# Patient Record
Sex: FEMALE | Race: BLACK OR AFRICAN AMERICAN | NOT HISPANIC OR LATINO | Employment: FULL TIME | URBAN - METROPOLITAN AREA
[De-identification: names, ages, dates, MRNs, and addresses within clinical notes are randomized per-mention and may not be internally consistent; named-entity substitution may affect disease eponyms.]

---

## 2020-03-24 ENCOUNTER — OFFICE VISIT (OUTPATIENT)
Dept: URGENT CARE | Facility: URGENT CARE | Age: 25
End: 2020-03-24

## 2020-03-24 VITALS
SYSTOLIC BLOOD PRESSURE: 102 MMHG | HEART RATE: 73 BPM | TEMPERATURE: 97 F | OXYGEN SATURATION: 98 % | WEIGHT: 151.4 LBS | RESPIRATION RATE: 20 BRPM | DIASTOLIC BLOOD PRESSURE: 70 MMHG

## 2020-03-24 DIAGNOSIS — R11.0 NAUSEA: ICD-10-CM

## 2020-03-24 DIAGNOSIS — R53.1 WEAKNESS: Primary | ICD-10-CM

## 2020-03-24 DIAGNOSIS — Z86.2 HISTORY OF ANEMIA: ICD-10-CM

## 2020-03-24 DIAGNOSIS — R53.83 OTHER FATIGUE: ICD-10-CM

## 2020-03-24 LAB
ALBUMIN SERPL-MCNC: 3.8 G/DL (ref 3.4–5)
ALBUMIN UR-MCNC: NEGATIVE MG/DL
ALP SERPL-CCNC: 102 U/L (ref 40–150)
ALT SERPL W P-5'-P-CCNC: 18 U/L (ref 0–50)
ANION GAP SERPL CALCULATED.3IONS-SCNC: 5 MMOL/L (ref 3–14)
APPEARANCE UR: CLEAR
AST SERPL W P-5'-P-CCNC: 7 U/L (ref 0–45)
BACTERIA #/AREA URNS HPF: ABNORMAL /HPF
BASOPHILS # BLD AUTO: 0 10E9/L (ref 0–0.2)
BASOPHILS NFR BLD AUTO: 0.3 %
BILIRUB SERPL-MCNC: 0.4 MG/DL (ref 0.2–1.3)
BILIRUB UR QL STRIP: NEGATIVE
BUN SERPL-MCNC: 8 MG/DL (ref 7–30)
CALCIUM SERPL-MCNC: 9.2 MG/DL (ref 8.5–10.1)
CHLORIDE SERPL-SCNC: 104 MMOL/L (ref 94–109)
CO2 SERPL-SCNC: 27 MMOL/L (ref 20–32)
COLOR UR AUTO: YELLOW
CREAT SERPL-MCNC: 0.72 MG/DL (ref 0.52–1.04)
DIFFERENTIAL METHOD BLD: ABNORMAL
EOSINOPHIL # BLD AUTO: 0.1 10E9/L (ref 0–0.7)
EOSINOPHIL NFR BLD AUTO: 0.6 %
ERYTHROCYTE [DISTWIDTH] IN BLOOD BY AUTOMATED COUNT: 19.1 % (ref 10–15)
GFR SERPL CREATININE-BSD FRML MDRD: >90 ML/MIN/{1.73_M2}
GLUCOSE SERPL-MCNC: 100 MG/DL (ref 70–99)
GLUCOSE UR STRIP-MCNC: NEGATIVE MG/DL
HCT VFR BLD AUTO: 33.1 % (ref 35–47)
HETEROPH AB SER QL: NEGATIVE
HGB BLD-MCNC: 9.8 G/DL (ref 11.7–15.7)
HGB UR QL STRIP: NEGATIVE
KETONES UR STRIP-MCNC: NEGATIVE MG/DL
LEUKOCYTE ESTERASE UR QL STRIP: NEGATIVE
LYMPHOCYTES # BLD AUTO: 2.1 10E9/L (ref 0.8–5.3)
LYMPHOCYTES NFR BLD AUTO: 19.7 %
MCH RBC QN AUTO: 20.2 PG (ref 26.5–33)
MCHC RBC AUTO-ENTMCNC: 29.6 G/DL (ref 31.5–36.5)
MCV RBC AUTO: 68 FL (ref 78–100)
MONOCYTES # BLD AUTO: 0.9 10E9/L (ref 0–1.3)
MONOCYTES NFR BLD AUTO: 8.7 %
NEUTROPHILS # BLD AUTO: 7.6 10E9/L (ref 1.6–8.3)
NEUTROPHILS NFR BLD AUTO: 70.7 %
NITRATE UR QL: NEGATIVE
NON-SQ EPI CELLS #/AREA URNS LPF: ABNORMAL /LPF
PH UR STRIP: 8.5 PH (ref 5–7)
PLATELET # BLD AUTO: 444 10E9/L (ref 150–450)
POTASSIUM SERPL-SCNC: 4.3 MMOL/L (ref 3.4–5.3)
PROT SERPL-MCNC: 8.7 G/DL (ref 6.8–8.8)
RBC # BLD AUTO: 4.85 10E12/L (ref 3.8–5.2)
RBC #/AREA URNS AUTO: ABNORMAL /HPF
SODIUM SERPL-SCNC: 136 MMOL/L (ref 133–144)
SOURCE: ABNORMAL
SP GR UR STRIP: 1.01 (ref 1–1.03)
TSH SERPL DL<=0.005 MIU/L-ACNC: 2.05 MU/L (ref 0.4–4)
UROBILINOGEN UR STRIP-ACNC: 0.2 EU/DL (ref 0.2–1)
WBC # BLD AUTO: 10.8 10E9/L (ref 4–11)
WBC #/AREA URNS AUTO: ABNORMAL /HPF

## 2020-03-24 PROCEDURE — 86308 HETEROPHILE ANTIBODY SCREEN: CPT | Performed by: FAMILY MEDICINE

## 2020-03-24 PROCEDURE — 80050 GENERAL HEALTH PANEL: CPT | Performed by: FAMILY MEDICINE

## 2020-03-24 PROCEDURE — 81001 URINALYSIS AUTO W/SCOPE: CPT | Performed by: FAMILY MEDICINE

## 2020-03-24 PROCEDURE — 36415 COLL VENOUS BLD VENIPUNCTURE: CPT | Performed by: FAMILY MEDICINE

## 2020-03-24 PROCEDURE — 99203 OFFICE O/P NEW LOW 30 MIN: CPT | Performed by: FAMILY MEDICINE

## 2020-03-24 RX ORDER — FERROUS GLUCONATE 324(38)MG
324 TABLET ORAL 2 TIMES DAILY WITH MEALS
Qty: 60 TABLET | Refills: 1 | Status: SHIPPED | OUTPATIENT
Start: 2020-03-24 | End: 2020-04-23

## 2020-04-04 NOTE — PROGRESS NOTES
SUBJECTIVE: Katiuska Cummings is a 25 year old female presenting with a chief complaint of weakness, nausea and fatigue with hx of anemia.  Onset of symptoms was day(s) ago.  Course of illness is worsening.    Severity moderate  Current and Associated symptoms: tired  Treatment measures tried include iron pills.  Predisposing factors include HX of anemia.    No past medical history on file.    No past surgical history on file.    No family history on file.    Social History     Tobacco Use     Smoking status: Current Some Day Smoker     Smokeless tobacco: Never Used   Substance Use Topics     Alcohol use: Not on file     Review Of Systems  Skin: negative  Eyes: negative  Ears/Nose/Throat: negative  Respiratory: No shortness of breath, dyspnea on exertion, cough, or hemoptysis  Cardiovascular: negative  Gastrointestinal: negative  Genitourinary: negative  Musculoskeletal: negative  Neurologic: as above  Psychiatric: negative  Hematologic/Lymphatic/Immunologic: anemi    OBJECTIVE:  /70   Pulse 73   Temp 97  F (36.1  C) (Oral)   Resp 20   Wt 68.7 kg (151 lb 6.4 oz)   SpO2 98% GENERAL APPEARANCE: healthy, alert and no distress  EYES: EOMI,  PERRL, conjunctiva clear  HENT: ear canals and TM's normal.  Nose and mouth without ulcers, erythema or lesions  NECK: supple, nontender, no lymphadenopathy  RESP: lungs clear to auscultation - no rales, rhonchi or wheezes  CV: regular rates and rhythm, normal S1 S2, no murmur noted  ABDOMEN:  soft, nontender, no HSM or masses and bowel sounds normal  NEURO: Normal strength and tone, sensory exam grossly normal,  normal speech and mentation  SKIN: no suspicious lesions or rashes      ICD-10-CM    1. Weakness  R53.1 Ferrous Sulfate (IRON PO)     CBC with platelets differential     TSH with free T4 reflex     Mononucleosis screen     Comprehensive metabolic panel     UA with Microscopic reflex to Culture     ferrous gluconate (FERGON) 324 (38 Fe) MG tablet   2. Other fatigue   R53.83 Ferrous Sulfate (IRON PO)     CBC with platelets differential     TSH with free T4 reflex     Mononucleosis screen     Comprehensive metabolic panel     UA with Microscopic reflex to Culture     ferrous gluconate (FERGON) 324 (38 Fe) MG tablet   3. Nausea  R11.0 Ferrous Sulfate (IRON PO)     CBC with platelets differential     TSH with free T4 reflex     Mononucleosis screen     Comprehensive metabolic panel     UA with Microscopic reflex to Culture   4. History of anemia  Z86.2 Ferrous Sulfate (IRON PO)     CBC with platelets differential     TSH with free T4 reflex     Mononucleosis screen     Comprehensive metabolic panel     ferrous gluconate (FERGON) 324 (38 Fe) MG tablet     F/u PCP  Fluids/Rest, f/u if worse/not any better

## 2021-01-04 ENCOUNTER — HEALTH MAINTENANCE LETTER (OUTPATIENT)
Age: 26
End: 2021-01-04

## 2021-10-10 ENCOUNTER — HEALTH MAINTENANCE LETTER (OUTPATIENT)
Age: 26
End: 2021-10-10

## 2022-01-30 ENCOUNTER — HEALTH MAINTENANCE LETTER (OUTPATIENT)
Age: 27
End: 2022-01-30

## 2022-09-24 ENCOUNTER — HEALTH MAINTENANCE LETTER (OUTPATIENT)
Age: 27
End: 2022-09-24

## 2023-05-08 ENCOUNTER — HEALTH MAINTENANCE LETTER (OUTPATIENT)
Age: 28
End: 2023-05-08

## 2024-02-19 ENCOUNTER — OFFICE VISIT (OUTPATIENT)
Dept: OBGYN | Facility: CLINIC | Age: 29
End: 2024-02-19
Payer: COMMERCIAL

## 2024-02-19 VITALS
HEIGHT: 60 IN | DIASTOLIC BLOOD PRESSURE: 60 MMHG | HEART RATE: 80 BPM | OXYGEN SATURATION: 100 % | WEIGHT: 155.7 LBS | BODY MASS INDEX: 30.57 KG/M2 | SYSTOLIC BLOOD PRESSURE: 104 MMHG

## 2024-02-19 DIAGNOSIS — Z30.09 BIRTH CONTROL COUNSELING: ICD-10-CM

## 2024-02-19 DIAGNOSIS — Z13.1 SCREENING FOR DIABETES MELLITUS: ICD-10-CM

## 2024-02-19 DIAGNOSIS — Z12.4 CERVICAL CANCER SCREENING: ICD-10-CM

## 2024-02-19 DIAGNOSIS — Z11.59 ENCOUNTER FOR HEPATITIS C SCREENING TEST FOR LOW RISK PATIENT: ICD-10-CM

## 2024-02-19 DIAGNOSIS — Z01.419 WELL WOMAN EXAM WITH ROUTINE GYNECOLOGICAL EXAM: Primary | ICD-10-CM

## 2024-02-19 DIAGNOSIS — Z11.4 ENCOUNTER FOR SCREENING FOR HIV: ICD-10-CM

## 2024-02-19 LAB
HBA1C MFR BLD: 5.4 % (ref 0–5.6)
HCV AB SERPL QL IA: NONREACTIVE
HIV 1+2 AB+HIV1 P24 AG SERPL QL IA: NONREACTIVE

## 2024-02-19 PROCEDURE — 83036 HEMOGLOBIN GLYCOSYLATED A1C: CPT | Performed by: OBSTETRICS & GYNECOLOGY

## 2024-02-19 PROCEDURE — 86803 HEPATITIS C AB TEST: CPT | Performed by: OBSTETRICS & GYNECOLOGY

## 2024-02-19 PROCEDURE — G0145 SCR C/V CYTO,THINLAYER,RESCR: HCPCS | Performed by: OBSTETRICS & GYNECOLOGY

## 2024-02-19 PROCEDURE — 36415 COLL VENOUS BLD VENIPUNCTURE: CPT | Performed by: OBSTETRICS & GYNECOLOGY

## 2024-02-19 PROCEDURE — 99385 PREV VISIT NEW AGE 18-39: CPT | Performed by: OBSTETRICS & GYNECOLOGY

## 2024-02-19 PROCEDURE — 87389 HIV-1 AG W/HIV-1&-2 AB AG IA: CPT | Performed by: OBSTETRICS & GYNECOLOGY

## 2024-02-19 RX ORDER — HYDROXYZINE HYDROCHLORIDE 25 MG/1
25 TABLET, FILM COATED ORAL PRN
COMMUNITY
Start: 2023-12-15

## 2024-02-19 ASSESSMENT — ANXIETY QUESTIONNAIRES
GAD7 TOTAL SCORE: 0
2. NOT BEING ABLE TO STOP OR CONTROL WORRYING: NOT AT ALL
1. FEELING NERVOUS, ANXIOUS, OR ON EDGE: NOT AT ALL
5. BEING SO RESTLESS THAT IT IS HARD TO SIT STILL: NOT AT ALL
IF YOU CHECKED OFF ANY PROBLEMS ON THIS QUESTIONNAIRE, HOW DIFFICULT HAVE THESE PROBLEMS MADE IT FOR YOU TO DO YOUR WORK, TAKE CARE OF THINGS AT HOME, OR GET ALONG WITH OTHER PEOPLE: NOT DIFFICULT AT ALL
GAD7 TOTAL SCORE: 0
6. BECOMING EASILY ANNOYED OR IRRITABLE: NOT AT ALL
3. WORRYING TOO MUCH ABOUT DIFFERENT THINGS: NOT AT ALL
7. FEELING AFRAID AS IF SOMETHING AWFUL MIGHT HAPPEN: NOT AT ALL

## 2024-02-19 ASSESSMENT — PATIENT HEALTH QUESTIONNAIRE - PHQ9
5. POOR APPETITE OR OVEREATING: NOT AT ALL
SUM OF ALL RESPONSES TO PHQ QUESTIONS 1-9: 1

## 2024-02-19 NOTE — LETTER
February 19, 2024      Katiuska Cummings  [address] [address]        To Whom It May Concern:    Katiuska Cummings was seen in our clinic. She may return to work with no restrictions.      Sincerely,            Elizabeth Kearns

## 2024-02-19 NOTE — PROGRESS NOTES
"Katiuska is a 29 year old  female who presents for annual exam.     Menses are regular q 28-30 days and normal and regular lasting 5 days.  Menses flow: normal.  Patient's last menstrual period was 2024 (exact date).. Not currently sexually active.  Considering becoming sexually active in the future.  Interested in starting birth control.  States she has never used birth control in the past.   Besides routine health maintenance, she has no other health concerns today .  GYNECOLOGIC HISTORY:  Menarche: 12  Age at first intercourse:  Number of lifetime partners: <6  No currently sexually active  History sexually transmitted infections:No STD history  STI testing offered?  Declined  SYLVESTER exposure: No  History of abnormal Pap smear: NO - age 21-29 PAP every 3 years recommended  Family history of breast CA: Yes (Please explain): Maternal Aunt  Family history of uterine/ovarian CA: No    Family history of colon CA: No    HEALTH MAINTENANCE:  Cholesterol: (No results found for: \"CHOL\" History of abnormal lipids: No  Mammo: N/A . History of abnormal Mammo: Not applicable.  Regular Self Breast Exams: Yes  Calcium/Vitamin D intake: source:  dairy Adequate? Yes  TSH: (  TSH   Date Value Ref Range Status   2020 2.05 0.40 - 4.00 mU/L Final    )  Pap- patient reports she has never had a pelvic exam or a pap smear in the past.  Explained pelvic exam with speculum and patient verbally consented to spec exam to collect pap smear today.    HISTORY:  OB History    Para Term  AB Living   0 0 0 0 0 0   SAB IAB Ectopic Multiple Live Births   0 0 0 0 0     Past Medical History:   Diagnosis Date    Anxiety    Patient denies any history of VTE, migraine with aura, or HTN  Past Surgical History:   Procedure Laterality Date    wisdom tooth removal       Family History   Problem Relation Age of Onset    Breast Cancer Maternal Aunt 45     Social History   Denies any tobacco, alcohol, or drug use.  States her diet " is half heathy.  She stats she likes sweets.  Reports her work is active and she irregularly exercises by doing cardio on an elliptical       Current Outpatient Medications:     hydrOXYzine HCl (ATARAX) 25 MG tablet, Take 25 mg by mouth as needed for anxiety, Disp: , Rfl:    No Known Allergies    Past medical, surgical, social and family history were reviewed and [facility] in [facility].    Patient denies any headache, nausea, vomiting, diarrhea, constipation, dysuria, abnormal discharge, or pelvic pain.    EXAM:  /60   Pulse 80   Ht 1.524 m (5')   Wt 70.6 kg (155 lb 11.2 oz)   LMP 2024 (Exact Date)   SpO2 100%   BMI 30.41 kg/m     BMI: Body mass index is 30.41 kg/m .  Constitutional: healthy, alert and no distress  Head: Normocephalic. No masses, lesions, tenderness or abnormalities  Neck: Neck supple. Trachea midline. No adenopathy. Thyroid symmetric, normal size.   Cardiovascular: regular rate and rhythm  Respiratory: clear to auscultation bilaterally   Breasts: normal without suspicious masses, skin changes or axillary nodes.  Gastrointestinal: Abdomen soft, non-tender, non-distended.   : external genitalia without lesion or masses, normal appearing vaginal mucosa, normal appearing cervix, small amount of thin white particulate vaginal discharge   Musculoskeletal: extremities normal  Skin: no suspicious lesions or rashes  Psychiatric: Affect appropriate, cooperative,mentation appears normal.         ASSESSMENT:  29 year old female with satisfactory annual exam     (Z01.419) Well woman exam with routine gynecological exam  (primary encounter diagnosis)  COUNSELING:   Reviewed preventive health counseling, as reflected in patient instructions       Regular exercise       Healthy diet/nutrition       Contraception       Consider Hep C screening for all patients one time for ages 18-79 years       HIV screeninx in teen years, 1x in adult years, and at intervals if high risk   reports that she has  quit smoking. Her smoking use included vaping device. She has never used smokeless tobacco.    Body mass index is 30.41 kg/m .  Weight management plan: Discussed healthy diet and exercise guidelines    (Z13.1) Screening for diabetes mellitus  Comment: BMI of 30.  No prior screening  Plan: Hemoglobin A1c    (Z11.59) Encounter for hepatitis C screening test for low risk patient  Comment: due for screen  Plan: Hepatitis C Screen Reflex to HCV RNA Quant and         Genotype    (Z11.4) Encounter for screening for HIV  Comment: due for screen  Plan: HIV Antigen Antibody Combo    (Z12.4) Cervical cancer screening  Comment: due for screen  Plan: Pap screen reflex to HPV if ASCUS - recommend         age 25 - 29    (Z30.09) Birth control counseling  Comment: Discussed birth control options including birth control pills, depo provera shot, IUDs, and Nexplanon.  Following discussion patient plans Mirena IUD insertion.  Discussed pretreating with ibuprofen and plan for GC/CT testing with IUD insertion.    Plan: patient to schedule IUD insertion procedure visit.    Elizabeth Kearns MD

## 2024-02-21 LAB
BKR LAB AP GYN ADEQUACY: NORMAL
BKR LAB AP GYN INTERPRETATION: NORMAL
BKR LAB AP HPV REFLEX: NORMAL
BKR LAB AP LMP: NORMAL
BKR LAB AP PREVIOUS ABNORMAL: NORMAL
PATH REPORT.COMMENTS IMP SPEC: NORMAL
PATH REPORT.COMMENTS IMP SPEC: NORMAL
PATH REPORT.RELEVANT HX SPEC: NORMAL

## 2024-03-21 ENCOUNTER — OFFICE VISIT (OUTPATIENT)
Dept: OBGYN | Facility: CLINIC | Age: 29
End: 2024-03-21
Payer: COMMERCIAL

## 2024-03-21 VITALS
HEART RATE: 78 BPM | SYSTOLIC BLOOD PRESSURE: 109 MMHG | OXYGEN SATURATION: 100 % | BODY MASS INDEX: 29.69 KG/M2 | WEIGHT: 152 LBS | DIASTOLIC BLOOD PRESSURE: 71 MMHG

## 2024-03-21 DIAGNOSIS — Z30.017 INSERTION OF IMPLANTABLE SUBDERMAL CONTRACEPTIVE: Primary | ICD-10-CM

## 2024-03-21 DIAGNOSIS — Z30.430 ENCOUNTER FOR IUD INSERTION: ICD-10-CM

## 2024-03-21 DIAGNOSIS — Z11.3 SCREEN FOR STD (SEXUALLY TRANSMITTED DISEASE): ICD-10-CM

## 2024-03-21 LAB — HCG UR QL: NEGATIVE

## 2024-03-21 PROCEDURE — 87491 CHLMYD TRACH DNA AMP PROBE: CPT | Performed by: OBSTETRICS & GYNECOLOGY

## 2024-03-21 PROCEDURE — 11981 INSERTION DRUG DLVR IMPLANT: CPT | Performed by: OBSTETRICS & GYNECOLOGY

## 2024-03-21 PROCEDURE — 81025 URINE PREGNANCY TEST: CPT | Performed by: OBSTETRICS & GYNECOLOGY

## 2024-03-21 PROCEDURE — 87591 N.GONORRHOEAE DNA AMP PROB: CPT | Performed by: OBSTETRICS & GYNECOLOGY

## 2024-03-21 PROCEDURE — 58300 INSERT INTRAUTERINE DEVICE: CPT | Mod: 52 | Performed by: OBSTETRICS & GYNECOLOGY

## 2024-03-21 NOTE — PROGRESS NOTES
IUD Insertion:  CONSULT:    Is a pregnancy test required: Yes.  Was it positive or negative?  Negative  Was a consent obtained?  Yes    Subjective: Katiuska Cummings is a 29 year old  presents for IUD and desires Mirena type IUD.    Patient has been given the opportunity to ask questions about all forms of birth control, including all options appropriate for Katiuska Cummings. Discussed that no method of birth control, except abstinence is 100% effective against pregnancy or sexually transmitted infection.     Katiuska Cummings understands she may have the IUD removed at any time. IUD should be removed by a health care provider.    The entire insertion procedure was reviewed with the patient, including care after placement.    Patient's last menstrual period was 2024 (within days).  No allergy to betadine or shellfish. Patient desires STD screening  hCG Urine Qualitative   Date Value Ref Range Status   2024 Negative Negative Final     Comment:     This test is for screening purposes.  Results should be interpreted along with the clinical picture.  Confirmation testing is available if warranted by ordering [id number], HCG Quantitative Pregnancy.         /71   Pulse 78   Wt 68.9 kg (152 lb)   LMP 2024 (Within Days)   SpO2 100%   BMI 29.69 kg/m      Pelvic Exam:   Normal appearing external genitalia, normal appearing vaginal mucosa, normal appearing cervix.  No vaginal discharge     PROCEDURE NOTE: -- IUD Insertion    Reason for Insertion: contraception and abnormal uterine bleeding    Under sterile technique, cervix was visualized with speculum and prepped with Betadine solution swab x 3. Tenaculum was placed for stability. The uterus was gently straightened and sounded and sounding attempt made with endometrial pipelle without success.  Blue os finder used to attempt to dilate internal os without success.  After several attempts this was discontinued.  Tenaculum removed and hemostasis noted  with application of silver nitrate. Speculum removed.  Patient tolerated procedure well.    Discussed with patient option for IUD placement attempt under ultrasound guidance vs. Alternative birth control.  We discussed alternative BC options and she would like to move forward with nexplanon insertion today.      Nexplanon Insertion:    Is a pregnancy test required: Yes.  Was it positive or negative?  Negative  Was a consent obtained?  Yes    Subjective: Katiuska Cummings is a 29 year old  presents for Nexplanon.    Patient has been given the opportunity to ask questions about all forms of birth control, including all options appropriate for Katiuska Cummings. Discussed that no method of birth control, except abstinence is 100% effective against pregnancy or sexually transmitted infection.     Katiuska Cummings understands she may have the Nexplanon removed at any time and it should be removed by a health care provider.    The entire insertion procedure was reviewed with the patient, including care after placement.    PROCEDURE NOTE: -- Nexplanon Insertion    Reason for Insertion: contraception    Patient was placed supine with left arm exposed.  Alden was made 8-10 cm above medial epicondyle and a guiding alden 4 cm above the first.  Arm was prepped with Betadine. Insertion point was anesthetized with 3 mL 1% lidocaine. After stretching the skin with thumb and index finger around the insertion site, skin punctured with the tip of the needle inserted at 30 degrees and then lowered to horizontal position. The needle was then advanced to its full length. Applicator was then stabilized and slider was unlocked. Slider was pulled back until it stopped and then removed.    Correct placement of the implant was confirmed by palpation in the patient's arm. Bandage and pressure dressing applied to insertion site.    EBL: minimal    Complications: none    ASSESSMENT:     ICD-10-CM    1. Insertion of implantable subdermal  contraceptive  Z30.017 etonogestrel (NEXPLANON) subdermal implant 68 mg     INSERTION NON-BIODEGRADABLE DRUG DELIVERY IMPLANT      2. Encounter for IUD insertion  Z30.430 HCG Qual, Urine ([id number])     HCG Qual, Urine ([id number])      3. Screen for STD (sexually transmitted disease)  Z11.3 Chlamydia trachomatis/Neisseria gonorrhoeae by PCR            PLAN:    Given 's handouts, including when to have Nexplanon removed, list of danger s/sx, side effects and follow up recommended. Encouraged condom use for prevention of STD. Back up contraception advised for 7 days. Advised to call for any fever, for prolonged or severe pain or bleeding, abnormal vaginal dischage. She was advised to use pain medications (ibuprofen) as needed for mild to moderate pain.     Elizabeth Kearns MD

## 2024-03-21 NOTE — PATIENT INSTRUCTIONS
What Nexplanon Users May Expect    For appropiate patients, Nexplanon is well tolerated and has a low early-removal rate.    Insertion site complications, such as prolonged pain or infection, are rare. Removal is occasionally difficult, and rarely requires a surgical procedure in the operating room.    Menstrual changes are common with Nexplanon. Bleeding may become more or less frequent or heavy, or absent. The bleeding pattern after the first three months is predictive of future bleeding, but the pattern may change at any time. Average bleeding is 18 days over 3 months. Over 50% of women experience rare over absent bleeding over the two year period, while 25% experience frequent or prolonged bleeding.    In clinical studies, users gained 3.7 pounds over two years. It is unknown what portion of this weight gain is related to Nexplanon    Women with a history of depressed mood may have worsening on Nexplanon, and may need to have the device removed.    Return to baseline ovulation patterns is seen 7-14 days after removal of Nexplanon.    Rarely, headaches and acne have also let to device removal.    Nexplanon may be less effective in women weight more than 130% of their ideal body weight.    Nexplanon does not protect against HIV or STDs.    Please call [facility] at [phone] if you have questions or concerns.    For more complete information:  [email or web address]     NEXPLANON AFTERCARE INSTRUCTIONS   Keep dressing on and dry for 24 hours, then remove wrap.  Replace bandaid daily for 5 days. Keep your user card in a safe place where you'll remember it.    You may have some pain at the site of the Nexplanon insertion. You can help relieve the discomfort with Tylenol (acetaminophen), Aspirin or Advil (ibuprofen). If your discomfort worsens or you notice redness spreading on the skin around the insertion site, please call the clinic.     Irregular  bleeding is common with Nexplanon, especially in the first 6-12 months of use. After one year, approximately 20% of women who use Nexplanon will stop having periods completely. Some women have longer, heavier periods. Some women will have increased spotting between periods. You may find that your periods may be hard to predict.     The Nexplanon does not protect against sexually transmitted infections including the AIDS virus (HIV), warts (HPV), gonorrhea, Chlamydia, and herpes. Condoms should be used to decrease the risk sexually transmitted infections. If you think that you have been exposed to a sexually transmitted infection, please call the clinic.     If you had Nexplanon placed for birth control, it is effective immediately if it was inserted within five days after the start of your period. If you have Nexplanon inserted at any other time during your menstrual cycle, use another method of birth control, like condoms for at least 7 days.     The Nexplanon should be removed and/or replaced by a health care provider after three years.   Warning Signs   Call the clinic if any of the following occurs:     You have bleeding, pus, or increasing redness, or pain at insertion site.     You have fever or chills     The implant comes out or you have concerns about its location.     You have a positive pregnancy test or suspect you might be pregnant.

## 2024-03-23 LAB
C TRACH DNA SPEC QL PROBE+SIG AMP: NEGATIVE
N GONORRHOEA DNA SPEC QL NAA+PROBE: NEGATIVE

## 2024-04-17 ENCOUNTER — OFFICE VISIT (OUTPATIENT)
Dept: OBGYN | Facility: CLINIC | Age: 29
End: 2024-04-17
Attending: STUDENT IN AN ORGANIZED HEALTH CARE EDUCATION/TRAINING PROGRAM

## 2024-04-17 VITALS
SYSTOLIC BLOOD PRESSURE: 105 MMHG | DIASTOLIC BLOOD PRESSURE: 65 MMHG | BODY MASS INDEX: 28.86 KG/M2 | HEART RATE: 67 BPM | WEIGHT: 147 LBS | HEIGHT: 60 IN

## 2024-04-17 DIAGNOSIS — Z30.016 ENCOUNTER FOR INITIAL PRESCRIPTION OF TRANSDERMAL PATCH HORMONAL CONTRACEPTIVE DEVICE: ICD-10-CM

## 2024-04-17 DIAGNOSIS — Z30.46 ENCOUNTER FOR NEXPLANON REMOVAL: Primary | ICD-10-CM

## 2024-04-17 PROCEDURE — 99213 OFFICE O/P EST LOW 20 MIN: CPT | Performed by: STUDENT IN AN ORGANIZED HEALTH CARE EDUCATION/TRAINING PROGRAM

## 2024-04-17 PROCEDURE — 11982 REMOVE DRUG IMPLANT DEVICE: CPT | Performed by: STUDENT IN AN ORGANIZED HEALTH CARE EDUCATION/TRAINING PROGRAM

## 2024-04-17 PROCEDURE — 99212 OFFICE O/P EST SF 10 MIN: CPT | Mod: 25 | Performed by: STUDENT IN AN ORGANIZED HEALTH CARE EDUCATION/TRAINING PROGRAM

## 2024-04-17 RX ORDER — NORELGESTROMIN AND ETHINYL ESTRADIOL 35; 150 UG/MG; UG/MG
PATCH TRANSDERMAL
Qty: 12 PATCH | Refills: 4 | Status: SHIPPED | OUTPATIENT
Start: 2024-04-17

## 2024-04-17 NOTE — PROGRESS NOTES
[facility]    HPI: Katiuska Cummings is a 29 year old G0 who presents to clinic today to discuss Nexplanon removal. She had a Nexplanon placed with Dr. Elizabeth Kearns on 3/21 after a failed IUD insertion. Since getting the Nexplanon placed she has noted worsening uterine cramping. Had cramping before with her menses but this is more severe. Irregular bleeding present, lighter than her typical periods. Today she had vomiting and nausea because the cramping is so severe. Asking for removal of the Nexplanon today. Denies history of migraines with aura, VTE.     PMH, PSH, Family history, Social history, medications and allergies were reviewed and updated in EMR.     Objective:   /65   Pulse 67   Ht 1.524 m (5')   Wt 66.7 kg (147 lb)   LMP 02/28/2024 (Within Days)   BMI 28.71 kg/m    General: Healthy appearing. Alert, oriented. Affect is appropriate.     Nexplanon removal:     The left arm skin was cleansed with Betadine.  3 cc of 1% lidocaine was injected over the planned area of removal. The Nexplanon was easily palpated. An incision was made through the prior scar, the Nexplanon elevated and removed without difficulty.  It was inspected and found to be intact. Steri-Strips, a bandage and a pressure dressing were placed over the incision site.  The patient tolerated the procedure well.    Assessment/Plan:   Katiuska Cummings is a 29 year old G0 female who desires Nexplanon removal. We discussed that severe uterine cramping is not a usual side effect of Nexplanon and another condition could be causing this. She declines further work-up today and would like the Nexplanon removed today.  Discussed other options for contraception.  Given inability to place IUD in the clinic previously we discussed the option of placement under ultrasound guidance.  However, I suspect that she may have a similar degree of uterine cramping with and after placement of an IUD.  Also discussed other options including  Depo-Provera and combined hormonal contraceptives.  She opted for a prescription of combined hormonal contraceptive patch.  Discussed the use of this, potential side effects and gave written resources.    Niyah Barksdale MD    An additional 15 minutes was spent on counseling for contraception outside of time spent on Nexplanon removal.

## 2024-04-17 NOTE — NURSING NOTE
Chief Complaint   Patient presents with    Procedure     Nexplanon removal and IUD placement if possible

## 2024-04-17 NOTE — LETTER
4/17/2024       RE: Katiuska Cummings  [address] [address]     Dear Colleague,    Thank you for referring your patient, Katiuska Cummings, to the [facility] at [facility]. Please see a copy of my visit note below.    [facility]    HPI: Katiuska Cummings is a 29 year old G0 who presents to clinic today to discuss Nexplanon removal. She had a Nexplanon placed with Dr. Elizabeth Kearns on 3/21 after a failed IUD insertion. Since getting the Nexplanon placed she has noted worsening uterine cramping. Had cramping before with her menses but this is more severe. Irregular bleeding present, lighter than her typical periods. Today she had vomiting and nausea because the cramping is so severe. Asking for removal of the Nexplanon today. Denies history of migraines with aura, VTE.     PMH, PSH, Family history, Social history, medications and allergies were reviewed and updated in EMR.     Objective:   /65   Pulse 67   Ht 1.524 m (5')   Wt 66.7 kg (147 lb)   LMP 02/28/2024 (Within Days)   BMI 28.71 kg/m    General: Healthy appearing. Alert, oriented. Affect is appropriate.     Nexplanon removal:     The left arm skin was cleansed with Betadine.  3 cc of 1% lidocaine was injected over the planned area of removal. The Nexplanon was easily palpated. An incision was made through the prior scar, the Nexplanon elevated and removed without difficulty.  It was inspected and found to be intact. Steri-Strips, a bandage and a pressure dressing were placed over the incision site.  The patient tolerated the procedure well.    Assessment/Plan:   Katiuska Cummings is a 29 year old G0 female who desires Nexplanon removal. We discussed that severe uterine cramping is not a usual side effect of Nexplanon and another condition could be causing this. She declines further work-up today and would like the Nexplanon removed  today.  Discussed other options for contraception.  Given inability to place IUD in the clinic previously we discussed the option of placement under ultrasound guidance.  However, I suspect that she may have a similar degree of uterine cramping with and after placement of an IUD.  Also discussed other options including Depo-Provera and combined hormonal contraceptives.  She opted for a prescription of combined hormonal contraceptive patch.  Discussed the use of this, potential side effects and gave written resources.    Niyah Barksdale MD    An additional 15 minutes was spent on counseling for contraception outside of time spent on Nexplanon removal.

## 2025-04-05 ENCOUNTER — HEALTH MAINTENANCE LETTER (OUTPATIENT)
Age: 30
End: 2025-04-05

## (undated) RX ORDER — LIDOCAINE HYDROCHLORIDE 10 MG/ML
INJECTION, SOLUTION INFILTRATION; PERINEURAL
Status: DISPENSED
Start: 2024-04-17